# Patient Record
Sex: FEMALE | Race: WHITE | NOT HISPANIC OR LATINO | Employment: OTHER | URBAN - METROPOLITAN AREA
[De-identification: names, ages, dates, MRNs, and addresses within clinical notes are randomized per-mention and may not be internally consistent; named-entity substitution may affect disease eponyms.]

---

## 2021-10-20 ENCOUNTER — LAB (OUTPATIENT)
Dept: LAB | Facility: HOSPITAL | Age: 51
End: 2021-10-20

## 2021-10-20 ENCOUNTER — OFFICE VISIT (OUTPATIENT)
Dept: NEUROLOGY | Facility: CLINIC | Age: 51
End: 2021-10-20

## 2021-10-20 VITALS
HEIGHT: 65 IN | WEIGHT: 154 LBS | DIASTOLIC BLOOD PRESSURE: 78 MMHG | BODY MASS INDEX: 25.66 KG/M2 | SYSTOLIC BLOOD PRESSURE: 106 MMHG | HEART RATE: 86 BPM

## 2021-10-20 DIAGNOSIS — R20.2 PARESTHESIA OF BOTH LOWER EXTREMITIES: Primary | ICD-10-CM

## 2021-10-20 DIAGNOSIS — R20.2 PARESTHESIA OF BOTH LOWER EXTREMITIES: ICD-10-CM

## 2021-10-20 LAB
DEPRECATED RDW RBC AUTO: 41.4 FL (ref 37–54)
ERYTHROCYTE [DISTWIDTH] IN BLOOD BY AUTOMATED COUNT: 12.3 % (ref 12.3–15.4)
ERYTHROCYTE [SEDIMENTATION RATE] IN BLOOD: 17 MM/HR (ref 0–30)
FOLATE SERPL-MCNC: >20 NG/ML (ref 4.78–24.2)
HCT VFR BLD AUTO: 37.5 % (ref 34–46.6)
HGB BLD-MCNC: 12.4 G/DL (ref 12–15.9)
MCH RBC QN AUTO: 30.8 PG (ref 26.6–33)
MCHC RBC AUTO-ENTMCNC: 33.1 G/DL (ref 31.5–35.7)
MCV RBC AUTO: 93.3 FL (ref 79–97)
PLATELET # BLD AUTO: 230 10*3/MM3 (ref 140–450)
PMV BLD AUTO: 11.2 FL (ref 6–12)
RBC # BLD AUTO: 4.02 10*6/MM3 (ref 3.77–5.28)
TSH SERPL DL<=0.05 MIU/L-ACNC: 1.41 UIU/ML (ref 0.27–4.2)
VIT B12 BLD-MCNC: >2000 PG/ML (ref 211–946)
WBC # BLD AUTO: 7.42 10*3/MM3 (ref 3.4–10.8)

## 2021-10-20 PROCEDURE — 86140 C-REACTIVE PROTEIN: CPT

## 2021-10-20 PROCEDURE — 99205 OFFICE O/P NEW HI 60 MIN: CPT | Performed by: NURSE PRACTITIONER

## 2021-10-20 PROCEDURE — 86334 IMMUNOFIX E-PHORESIS SERUM: CPT

## 2021-10-20 PROCEDURE — 84443 ASSAY THYROID STIM HORMONE: CPT

## 2021-10-20 PROCEDURE — 82607 VITAMIN B-12: CPT

## 2021-10-20 PROCEDURE — 84165 PROTEIN E-PHORESIS SERUM: CPT

## 2021-10-20 PROCEDURE — 82746 ASSAY OF FOLIC ACID SERUM: CPT

## 2021-10-20 PROCEDURE — 83825 ASSAY OF MERCURY: CPT

## 2021-10-20 PROCEDURE — 83921 ORGANIC ACID SINGLE QUANT: CPT

## 2021-10-20 PROCEDURE — 82784 ASSAY IGA/IGD/IGG/IGM EACH: CPT

## 2021-10-20 PROCEDURE — 85652 RBC SED RATE AUTOMATED: CPT

## 2021-10-20 PROCEDURE — 36415 COLL VENOUS BLD VENIPUNCTURE: CPT

## 2021-10-20 PROCEDURE — 85027 COMPLETE CBC AUTOMATED: CPT

## 2021-10-20 PROCEDURE — 82175 ASSAY OF ARSENIC: CPT

## 2021-10-20 PROCEDURE — 80053 COMPREHEN METABOLIC PANEL: CPT

## 2021-10-20 PROCEDURE — 83655 ASSAY OF LEAD: CPT

## 2021-10-20 RX ORDER — GABAPENTIN 300 MG/1
300 CAPSULE ORAL 3 TIMES DAILY
COMMUNITY
Start: 2021-08-31 | End: 2022-04-21

## 2021-10-20 RX ORDER — FEXOFENADINE HCL 180 MG/1
180 TABLET ORAL DAILY
COMMUNITY

## 2021-10-20 RX ORDER — METOPROLOL SUCCINATE 50 MG/1
50 TABLET, EXTENDED RELEASE ORAL DAILY
COMMUNITY
Start: 2021-09-01

## 2021-10-20 RX ORDER — ESTRADIOL 10 UG/1
1 INSERT VAGINAL 2 TIMES WEEKLY
COMMUNITY
Start: 2021-09-01

## 2021-10-20 RX ORDER — TURMERIC 400 MG
CAPSULE ORAL
COMMUNITY

## 2021-10-20 RX ORDER — CYCLOBENZAPRINE HCL 10 MG
10 TABLET ORAL 3 TIMES DAILY
COMMUNITY
Start: 2021-08-31

## 2021-10-20 RX ORDER — PHENOL 1.4 %
600 AEROSOL, SPRAY (ML) MUCOUS MEMBRANE DAILY
COMMUNITY

## 2021-10-20 NOTE — PROGRESS NOTES
"Chief Complaint  Neurologic Problem (neuropathy)    Abhilash Selby presents to [facility] NEUROLOGY & NEUROSURGERY  Endorses constant paresthesia to bilateral feet.  Also having episodes of Raynaud's Phenomenon.  States she's having a lot of low back pain with radiculopathy.  Seeing Dr. Dove in [place] for low back pain.  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  States she is experiencing bodywide pain.  Will bump into something and have severe pain to that part of her body.  States she feels better when she is sitting still or resting but is exacerbated with activity.  Has been on gabapentin and a muscle relaxant with no improvement.  States her symptoms began several years ago.        Objective   Vital Signs:   /78   Pulse 86   Ht 165.1 cm (65\")   Wt 69.9 kg (154 lb)   BMI 25.63 kg/m²     Physical Exam   Neurologic Exam     Cranial Nerves   Cranial nerves II through XII intact.     Motor Exam     Strength   Strength 5/5 except as noted.   Right iliopsoas: 4/5  Left iliopsoas: 4/5Pain with hip flexor resistance.      Sensory Exam   Light touch normal.   Right leg pinprick: decreased from knee  Left leg pinprick: decreased from knee    Gait, Coordination, and Reflexes     Reflexes   Reflexes 2+ except as noted.        Result Review :               Assessment and Plan    Diagnoses and all orders for this visit:    1. Paresthesia of both lower extremities (Primary)  Assessment & Plan:  Will order labs and EMG for further evaluation.     Orders:  -     CBC (No Diff); Future  -     Comprehensive Metabolic Panel; Future  -     Vitamin B12 & Folate; Future  -     Protein Elec + Interp, Serum; Future  -     Immunofixation, Serum; Future  -     C-reactive Protein; Future  -     Sedimentation Rate; Future  -     EMG & Nerve Conduction Test; Future  -     Methylmalonic Acid, Serum; " Future  -     TSH Rfx On Abnormal To Free T4; Future  -     Heavy Metals, Blood; Future    I spent 60 minutes caring for Blanka on this date of service. This time includes time spent by me in the following activities:preparing for the visit, reviewing tests, obtaining and/or reviewing a separately obtained history, performing a medically appropriate examination and/or evaluation , counseling and educating the patient/family/caregiver, ordering medications, tests, or procedures, documenting information in the medical record and independently interpreting results and communicating that information with the patient/family/caregiver  Follow Up   Return in about 3 months (around 1/20/2022) for paresthesia/ EMG results.  Patient was given instructions and counseling regarding her condition or for health maintenance advice. Please see specific information pulled into the AVS if appropriate.

## 2021-10-21 ENCOUNTER — TELEPHONE (OUTPATIENT)
Dept: NEUROLOGY | Facility: CLINIC | Age: 51
End: 2021-10-21

## 2021-10-21 LAB
ALBUMIN SERPL-MCNC: 4.7 G/DL (ref 3.5–5.2)
ALBUMIN/GLOB SERPL: 1.7 G/DL
ALP SERPL-CCNC: 61 U/L (ref 39–117)
ALT SERPL W P-5'-P-CCNC: 22 U/L (ref 1–33)
ANION GAP SERPL CALCULATED.3IONS-SCNC: 14 MMOL/L (ref 5–15)
AST SERPL-CCNC: 22 U/L (ref 1–32)
BILIRUB SERPL-MCNC: 0.3 MG/DL (ref 0–1.2)
BUN SERPL-MCNC: 9 MG/DL (ref 6–20)
BUN/CREAT SERPL: 15.3 (ref 7–25)
CALCIUM SPEC-SCNC: 9.9 MG/DL (ref 8.6–10.5)
CHLORIDE SERPL-SCNC: 99 MMOL/L (ref 98–107)
CO2 SERPL-SCNC: 21 MMOL/L (ref 22–29)
CREAT SERPL-MCNC: 0.59 MG/DL (ref 0.57–1)
CRP SERPL-MCNC: <0.3 MG/DL (ref 0–0.5)
GFR SERPL CREATININE-BSD FRML MDRD: 107 ML/MIN/1.73
GLOBULIN UR ELPH-MCNC: 2.7 GM/DL
GLUCOSE SERPL-MCNC: 75 MG/DL (ref 65–99)
POTASSIUM SERPL-SCNC: 4.5 MMOL/L (ref 3.5–5.2)
PROT SERPL-MCNC: 7.4 G/DL (ref 6–8.5)
SODIUM SERPL-SCNC: 134 MMOL/L (ref 136–145)

## 2021-10-21 NOTE — TELEPHONE ENCOUNTER
PT CALLED IN ASKING IF THE NURSE THAT HELPED HER TO HER ROOM YESTERDAY 10/20 WOULD CALL HER BACK AS SHE HAS SOME QUESTIONS TO ASK HER. SHES NOT GOING INTO DETAILED ABOUT WHAT THE QUESTIONS ARE.       PLEASE ADVISE.     CALL BACK [phone]

## 2021-10-22 PROBLEM — R20.2 PARESTHESIA OF BOTH LOWER EXTREMITIES: Status: ACTIVE | Noted: 2021-10-22

## 2021-10-22 LAB
ALBUMIN SERPL ELPH-MCNC: 4.1 G/DL (ref 2.9–4.4)
ALBUMIN/GLOB SERPL: 1.4 {RATIO} (ref 0.7–1.7)
ALPHA1 GLOB SERPL ELPH-MCNC: 0.2 G/DL (ref 0–0.4)
ALPHA2 GLOB SERPL ELPH-MCNC: 0.8 G/DL (ref 0.4–1)
B-GLOBULIN SERPL ELPH-MCNC: 1 G/DL (ref 0.7–1.3)
GAMMA GLOB SERPL ELPH-MCNC: 0.9 G/DL (ref 0.4–1.8)
GLOBULIN SER CALC-MCNC: 3 G/DL (ref 2.2–3.9)
IGA SERPL-MCNC: 222 MG/DL (ref 87–352)
IGG SERPL-MCNC: 885 MG/DL (ref 586–1602)
IGM SERPL-MCNC: 110 MG/DL (ref 26–217)
LABORATORY COMMENT REPORT: NORMAL
M PROTEIN SERPL ELPH-MCNC: NORMAL G/DL
PROT PATTERN SERPL ELPH-IMP: NORMAL
PROT PATTERN SERPL IFE-IMP: NORMAL
PROT SERPL-MCNC: 7.1 G/DL (ref 6–8.5)

## 2021-10-23 LAB
Lab: NORMAL
METHYLMALONATE SERPL-SCNC: 116 NMOL/L (ref 0–378)

## 2021-10-25 LAB
ARSENIC BLD-MCNC: 1 UG/L (ref 2–23)
LEAD BLDV-MCNC: <1 UG/DL (ref 0–4)
MERCURY BLD-MCNC: <1 UG/L (ref 0–14.9)

## 2021-11-15 ENCOUNTER — PROCEDURE VISIT (OUTPATIENT)
Dept: NEUROLOGY | Facility: CLINIC | Age: 51
End: 2021-11-15

## 2021-11-15 VITALS
HEART RATE: 75 BPM | BODY MASS INDEX: 26.46 KG/M2 | WEIGHT: 155 LBS | HEIGHT: 64 IN | SYSTOLIC BLOOD PRESSURE: 150 MMHG | DIASTOLIC BLOOD PRESSURE: 85 MMHG

## 2021-11-15 DIAGNOSIS — R20.2 PARESTHESIA OF BOTH LOWER EXTREMITIES: ICD-10-CM

## 2021-11-15 DIAGNOSIS — G56.01 CARPAL TUNNEL SYNDROME OF RIGHT WRIST: Primary | ICD-10-CM

## 2021-11-15 PROCEDURE — 95912 NRV CNDJ TEST 11-12 STUDIES: CPT | Performed by: PSYCHIATRY & NEUROLOGY

## 2021-11-15 NOTE — ASSESSMENT & PLAN NOTE
Nerve conduction study of the lower extremities is normal and does not show electrophysiologic evidence for entrapment neuropathy or peripheral neuropathy.    EMG study is not a useful diagnostic test for involvement of the dorsal nerve root and therefore it was not performed.  An MRI of the lumbar spine is more diagnostically useful for lumbosacral radiculopathy.  She has a follow-up visit with orthopedic surgery for her back pain.

## 2021-11-15 NOTE — ASSESSMENT & PLAN NOTE
The study is abnormal shows electrophysiologic evidence for moderate right carpal tunnel syndrome.

## 2021-11-15 NOTE — PROGRESS NOTES
"Chief Complaint  Numbness (BLE & BUE), Pain (BLE & BUE), and Extremity Weakness (BLE & BUE)    Subjective          Blanka Selby is a 51 y.o. female who presents to [facility] NEUROLOGY & NEUROSURGERY  History of Present Illness  51-year-old woman here for nerve conduction/EMG study.  Is complaining of numbness in her legs.  Objective   Vital Signs:   /85   Pulse 75   Ht 162.6 cm (64\")   Wt 70.3 kg (155 lb)   BMI 26.61 kg/m²     Physical Exam normal reflexes. .          Assessment and Plan  Diagnoses and all orders for this visit:    1. Carpal tunnel syndrome of right wrist (Primary)  Assessment & Plan:  The study is abnormal shows electrophysiologic evidence for moderate right carpal tunnel syndrome.      2. Paresthesia of both lower extremities  Assessment & Plan:  Nerve conduction study of the lower extremities is normal and does not show electrophysiologic evidence for entrapment neuropathy or peripheral neuropathy.    EMG study is not a useful diagnostic test for involvement of the dorsal nerve root and therefore it was not performed.  An MRI of the lumbar spine is more diagnostically useful for lumbosacral radiculopathy.  She has a follow-up visit with orthopedic surgery for her back pain.    Orders:  -     EMG & Nerve Conduction Test       Nerve Conduction Study:  12 nerves     EMG:  Not done    Total time spent with the patient and coordinating patient care was 0 minutes.    Follow Up  No follow-ups on file.  Patient was given instructions and counseling regarding her condition or for health maintenance advice. Please see specific information pulled into the AVS if appropriate.       "

## 2021-11-17 ENCOUNTER — OFFICE VISIT (OUTPATIENT)
Dept: ORTHOPEDIC SURGERY | Facility: CLINIC | Age: 51
End: 2021-11-17

## 2021-11-17 VITALS — WEIGHT: 155 LBS | BODY MASS INDEX: 26.46 KG/M2 | HEIGHT: 64 IN | OXYGEN SATURATION: 100 % | HEART RATE: 57 BPM

## 2021-11-17 DIAGNOSIS — M54.50 MIDLINE LOW BACK PAIN, UNSPECIFIED CHRONICITY, UNSPECIFIED WHETHER SCIATICA PRESENT: Primary | ICD-10-CM

## 2021-11-17 DIAGNOSIS — M25.551 RIGHT HIP PAIN: ICD-10-CM

## 2021-11-17 PROCEDURE — 99213 OFFICE O/P EST LOW 20 MIN: CPT | Performed by: ORTHOPAEDIC SURGERY

## 2021-11-17 RX ORDER — OMEPRAZOLE 20 MG/1
CAPSULE, DELAYED RELEASE ORAL
COMMUNITY
Start: 2021-10-22

## 2021-11-17 RX ORDER — CETIRIZINE HCL 10 MG/1
TABLET ORAL
COMMUNITY
Start: 2021-10-22

## 2021-11-17 NOTE — PROGRESS NOTES
"Chief Complaint  Initial Evaluation of the Right Hip     Subjective      Blanka Selby presents to [facility] ORTHOPEDICS for an evaluation of right hip. Patient has been having pain in her right hip since March of last year, approximately 16 months. She states she does have some low back pain and has received injections in the past for this, that did not provide her with much relief. She states pain does radiate down the leg and into the top of the foot. Pain is localized around the midline of the lower back. She does have numbness and tingling in her foot. She sometimes has numbness and tingling in the left foot.     No Known Allergies     Social History     Socioeconomic History   • Marital status:    Tobacco Use   • Smoking status: Never Smoker   • Smokeless tobacco: Never Used        Review of Systems     Objective   Vital Signs:   Pulse 57   Ht 162.6 cm (64\")   Wt 70.3 kg (155 lb)   SpO2 100%   BMI 26.61 kg/m²       Physical Exam  Constitutional:       Appearance: Normal appearance. Patient is well-developed and normal weight.   HENT:      Head: Normocephalic.      Right Ear: Hearing and external ear normal.      Left Ear: Hearing and external ear normal.      Nose: Nose normal.   Eyes:      Conjunctiva/sclera: Conjunctivae normal.   Cardiovascular:      Rate and Rhythm: Normal rate.   Pulmonary:      Effort: Pulmonary effort is normal.      Breath sounds: No wheezing or rales.   Abdominal:      Palpations: Abdomen is soft.      Tenderness: There is no abdominal tenderness.   Musculoskeletal:      Cervical back: Normal range of motion.   Skin:     Findings: No rash.   Neurological:      Mental Status: Patient  is alert and oriented to person, place, and time.   Psychiatric:         Mood and Affect: Mood and affect normal.         Judgment: Judgment normal.       Ortho Exam      RIGHT HIP: Good tone of hip flexors, hip extensors, hip adductor, hip abductors. Non-tender hip. Good " strength to hamstrings, quadriceps, dorsiflexors and plantar flexors. No groin pain with hip range of motion. Sciatica. Sensation grossly intact. Neurovascular intact.  Dorsal Pedal Pulse 2+, posterior tibialis pulse 2+. Calf supple, non-tender, no signs of DVT. Full dorsiflexion and plantar flexion. Atrophy of the gluteus musculature.       Procedures        Imaging Results (Most Recent)     Procedure Component Value Units Date/Time    XR Hip With or Without Pelvis 2 - 3 View Right [[id number]] Resulted: 11/17/21 1308     Updated: 11/17/21 1314           Result Review :     X-Ray Report:  Right hip(s) X-Ray  Indication: Evaluation of right hip pain   AP and Lateral view(s)  Findings: No acute osseous abnormalities. No fracture or dislocation.   Prior studies available for comparison: no       Assessment and Plan     DX: Low back with L4-5 radiculopathy pain  Right hip pain    Patient has good range of motion of the hip, she has no groin pain. A lot of her pain is about the lower back. We will obtain an MRI of the lower back.     Call or return if worsening symptoms.    Follow Up     Follow-up after MRI.       Patient was given instructions and counseling regarding her condition or for health maintenance advice. Please see specific information pulled into the AVS if appropriate.     Scribed for Otf Teixeira MD by Pattie Pedro.  11/17/21   13:17 EST    I have personally performed the services described in this document as scribed by the above individual and it is both accurate and complete. Otf Teixeira MD 11/17/21

## 2021-12-21 ENCOUNTER — APPOINTMENT (OUTPATIENT)
Dept: MRI IMAGING | Facility: HOSPITAL | Age: 51
End: 2021-12-21

## 2021-12-30 ENCOUNTER — HOSPITAL ENCOUNTER (OUTPATIENT)
Dept: MRI IMAGING | Facility: HOSPITAL | Age: 51
Discharge: HOME | End: 2021-12-30
Admitting: ORTHOPAEDIC SURGERY

## 2021-12-30 DIAGNOSIS — M54.50 MIDLINE LOW BACK PAIN, UNSPECIFIED CHRONICITY, UNSPECIFIED WHETHER SCIATICA PRESENT: ICD-10-CM

## 2021-12-30 PROCEDURE — 72148 MRI LUMBAR SPINE W/O DYE: CPT

## 2022-03-17 ENCOUNTER — OFFICE VISIT (OUTPATIENT)
Dept: NEUROLOGY | Facility: CLINIC | Age: 52
End: 2022-03-17

## 2022-03-17 VITALS
DIASTOLIC BLOOD PRESSURE: 67 MMHG | SYSTOLIC BLOOD PRESSURE: 137 MMHG | WEIGHT: 151.5 LBS | HEART RATE: 63 BPM | HEIGHT: 64 IN | BODY MASS INDEX: 25.86 KG/M2

## 2022-03-17 DIAGNOSIS — R20.2 PARESTHESIA OF BOTH LOWER EXTREMITIES: Primary | ICD-10-CM

## 2022-03-17 DIAGNOSIS — G56.01 CARPAL TUNNEL SYNDROME OF RIGHT WRIST: ICD-10-CM

## 2022-03-17 DIAGNOSIS — M51.36 LUMBAR DEGENERATIVE DISC DISEASE: ICD-10-CM

## 2022-03-17 PROCEDURE — 99214 OFFICE O/P EST MOD 30 MIN: CPT | Performed by: NURSE PRACTITIONER

## 2022-03-17 RX ORDER — ESTRADIOL 0.1 MG/G
CREAM VAGINAL
COMMUNITY
Start: 2022-02-02

## 2022-03-17 NOTE — ASSESSMENT & PLAN NOTE
Nerve conduction study was normal and did not show any sign of neuropathy.  I am more concerned of an autoimmune origin since she is having Raynaud's phenomenon and redness and swelling with her symptoms.  Recommend that she speak to PCP about a rheumatology referral.

## 2022-03-17 NOTE — PROGRESS NOTES
"Chief Complaint  Neurologic Problem (paresthesia)    Abhilash Selby presents to [facility] NEUROLOGY & NEUROSURGERY  Continuing to parents paresthesia to bilateral feet.  Had L-spine MRI ordered by orthopedics but states she never had a follow-up appointment with them and she is requesting her results.  Continuing to have low back pain.  Continuing to have episodes of Raynaud's phenomenon.    Interval History:   Endorses constant paresthesia to bilateral feet.  Also having episodes of Raynaud's Phenomenon.  States she's having a lot of low back pain with radiculopathy.  Seeing Dr. Dove in [place] for low back pain.  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  States she is experiencing bodywide pain.  Will bump into something and have severe pain to that part of her body.  States she feels better when she is sitting still or resting but is exacerbated with activity.  Has been on gabapentin and a muscle relaxant with no improvement.  States her symptoms began several years ago.        Objective   Vital Signs:   /67   Pulse 63   Ht 162.6 cm (64\")   Wt 68.7 kg (151 lb 8 oz)   BMI 26.00 kg/m²     Physical Exam   Neurologic Exam     Cranial Nerves   Cranial nerves II through XII intact.     Motor Exam     Strength   Strength 5/5 except as noted.   Right iliopsoas: 4/5  Left iliopsoas: 4/5Pain with hip flexor resistance.      Sensory Exam   Light touch normal.   Right leg pinprick: decreased from knee  Left leg pinprick: decreased from knee    Gait, Coordination, and Reflexes     Reflexes   Reflexes 2+ except as noted.        Result Review :            EMG: NCV showed moderate right CTS.  Needle study wasn't performed at the discretion of the physician.       L Spine MRI:   T12-L1:  There is a disc protrusion right of central.  The intervertebral foramina seem patent.  L1-L2:   There is " bulging of the annulus.  The intervertebral foramina appear patent.  L2-L3:   There is some minimal bulging of the annulus.  The intervertebral foramina appear patent.  L3-L4:   There is bulging of the annulus.  The intervertebral foramina appear patent.  L4-L5:   There is an anterolisthesis of L4 on L5.  There is facet arthropathy.  There are effusions   within the facet joints.  There is some suggested narrowing of the intervertebral foramina.  L5-S1:   There is some minimal bulging of the annulus.  The intervertebral foramina appear patent.     There are Tarlov cysts within the sacral area.    Assessment and Plan    Diagnoses and all orders for this visit:    1. Paresthesia of both lower extremities (Primary)  Assessment & Plan:  Nerve conduction study was normal and did not show any sign of neuropathy.  I am more concerned of an autoimmune origin since she is having Raynaud's phenomenon and redness and swelling with her symptoms.  Recommend that she speak to PCP about a rheumatology referral.      2. Lumbar degenerative disc disease  Assessment & Plan:  Will refer to neurosurgery for further evaluation and management.    Orders:  -     Ambulatory Referral to Neurosurgery    3. Carpal tunnel syndrome of right wrist      Follow Up   Return if symptoms worsen or fail to improve.  Patient was given instructions and counseling regarding her condition or for health maintenance advice. Please see specific information pulled into the AVS if appropriate.

## 2022-04-21 ENCOUNTER — OFFICE VISIT (OUTPATIENT)
Dept: NEUROSURGERY | Facility: CLINIC | Age: 52
End: 2022-04-21

## 2022-04-21 ENCOUNTER — HOSPITAL ENCOUNTER (OUTPATIENT)
Dept: GENERAL RADIOLOGY | Facility: HOSPITAL | Age: 52
Discharge: HOME | End: 2022-04-21
Admitting: NURSE PRACTITIONER

## 2022-04-21 VITALS
WEIGHT: 150 LBS | SYSTOLIC BLOOD PRESSURE: 97 MMHG | HEART RATE: 68 BPM | BODY MASS INDEX: 25.61 KG/M2 | HEIGHT: 64 IN | DIASTOLIC BLOOD PRESSURE: 68 MMHG

## 2022-04-21 DIAGNOSIS — M43.10 ACQUIRED SPONDYLOLISTHESIS: ICD-10-CM

## 2022-04-21 DIAGNOSIS — M43.10 ACQUIRED SPONDYLOLISTHESIS: Primary | ICD-10-CM

## 2022-04-21 DIAGNOSIS — M54.42 CHRONIC MIDLINE LOW BACK PAIN WITH BILATERAL SCIATICA: ICD-10-CM

## 2022-04-21 DIAGNOSIS — M47.816 LUMBAR FACET ARTHROPATHY: ICD-10-CM

## 2022-04-21 DIAGNOSIS — M54.41 CHRONIC MIDLINE LOW BACK PAIN WITH BILATERAL SCIATICA: ICD-10-CM

## 2022-04-21 DIAGNOSIS — M51.36 DEGENERATIVE DISC DISEASE, LUMBAR: ICD-10-CM

## 2022-04-21 DIAGNOSIS — G89.29 CHRONIC MIDLINE LOW BACK PAIN WITH BILATERAL SCIATICA: ICD-10-CM

## 2022-04-21 PROCEDURE — 72114 X-RAY EXAM L-S SPINE BENDING: CPT

## 2022-04-21 PROCEDURE — 99215 OFFICE O/P EST HI 40 MIN: CPT | Performed by: NURSE PRACTITIONER

## 2022-04-21 NOTE — PROGRESS NOTES
"Chief Complaint  Back Pain    Subjective          Blanka Selby who is a 52 y.o. year old female who presents to [facility] NEUROLOGY & NEUROSURGERY for evaluation of low back pain.     Patient presenting with concerns of low back pain, starting in 2019. 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 She has severe pain in the low back, with electric pain into the legs. She saw chiropractor at that time which provided modest benefit. Pt rates pain 10/10. Described as aching, throbbing and electric. Pain does radiate into the hips, pelvis, and both legs in a L5 distribution. Pt has some concerns of lower extremity weakness. Pt denies problems with bowel and bladder.     Recent Interventions for Pain: She continues to see a chiropractor. She is wearing a LSO which provides modest benefit. She takes Tylenol. Tried Gabapentin, which helped though made her very drowsy so she stopped this. She has been taking CBD gummies with THC. Heat provides temporary relief. She had physical therapy previously. She saw pain management, received lumbar epidural steroid injections which provided little benefit.     Prior Surgery: no          Review of Systems   Musculoskeletal: Positive for arthralgias, back pain and gait problem.   Neurological: Positive for numbness.   All other systems reviewed and are negative.       Objective   Vital Signs:   BP 97/68   Pulse 68   Ht 162.6 cm (64\")   Wt 68 kg (150 lb)   BMI 25.75 kg/m²       Physical Exam  Vitals reviewed.   Constitutional:       Appearance: Normal appearance.   Musculoskeletal:      Lumbar back: Tenderness present. Negative right straight leg raise test and negative left straight leg raise test.      Right hip: No tenderness. Normal range of motion.      Left hip: No tenderness. Normal range of motion.   Neurological:      Mental Status: She is alert and oriented to person, place, and " time.      Gait: Gait is intact.      Deep Tendon Reflexes: Strength normal.      Reflex Scores:       Patellar reflexes are 2+ on the right side and 2+ on the left side.       Achilles reflexes are 2+ on the right side and 2+ on the left side.       Neurologic Exam     Mental Status   Oriented to person, place, and time.   Level of consciousness: alert    Motor Exam   Muscle bulk: normal  Overall muscle tone: normal    Strength   Strength 5/5 throughout.     Sensory Exam   Light touch normal.     Gait, Coordination, and Reflexes     Gait  Gait: normal    Reflexes   Right patellar: 2+  Left patellar: 2+  Right achilles: 2+  Left achilles: 2+  Right ankle clonus: absent  Left ankle clonus: absent       Result Review :       Data reviewed: Radiologic studies MRI Lumbar Spine on 12/30/21 at [facility] personally reviewed. Grade 1 anterolisthesis (5 mm) of L4 on L5, with facet arthropathy and effusions within the facet joints. Mild bilateral foraminal narrowing. Multilevel DDD.          Assessment and Plan    Diagnoses and all orders for this visit:    1. Acquired spondylolisthesis (Primary)  -     XR Spine Lumbar Complete With Flex & Ext; Future    2. Degenerative disc disease, lumbar    3. Lumbar facet arthropathy    4. Chronic midline low back pain with bilateral sciatica    Pt presenting with concerns of low back pain with sciatica when standing. We reviewed her MRI Lumbar Spine, demonstrating spondylolisthesis of L4 on L5 with disc degeneration and facet arthropathy. Will order XR Lumbar Spine with bending views to evaluate for instability. If there is movement within the spine, will schedule follow up to discuss lumbar fusion. If no movement, we discussed a referral for spinal cord stimulator. She sees Dr. Dove in [place] for pain management.     I spent 40 minutes caring for Blanka on this date of service. This time includes time spent by me in the following activities:preparing for the visit, reviewing tests,  obtaining and/or reviewing a separately obtained history, performing a medically appropriate examination and/or evaluation , counseling and educating the patient/family/caregiver, ordering medications, tests, or procedures, documenting information in the medical record and independently interpreting results and communicating that information with the patient/family/caregiver.    Follow Up   Return if symptoms worsen or fail to improve.  Patient was given instructions and counseling regarding her condition or for health maintenance advice.     -XR Lumbar Spine bending views  -Will call with results

## 2022-04-27 NOTE — PROGRESS NOTES
For the SCS I will place a referral to pain management to evaluate for this. They would be the ones who do the procedure. We discussed SCS at her last visit.

## 2022-05-04 DIAGNOSIS — G89.29 CHRONIC MIDLINE LOW BACK PAIN WITH BILATERAL SCIATICA: Primary | ICD-10-CM

## 2022-05-04 DIAGNOSIS — M43.10 ACQUIRED SPONDYLOLISTHESIS: ICD-10-CM

## 2022-05-04 DIAGNOSIS — M54.41 CHRONIC MIDLINE LOW BACK PAIN WITH BILATERAL SCIATICA: Primary | ICD-10-CM

## 2022-05-04 DIAGNOSIS — M51.36 DEGENERATIVE DISC DISEASE, LUMBAR: ICD-10-CM

## 2022-05-04 DIAGNOSIS — M54.42 CHRONIC MIDLINE LOW BACK PAIN WITH BILATERAL SCIATICA: Primary | ICD-10-CM
